# Patient Record
Sex: MALE | Race: WHITE | NOT HISPANIC OR LATINO | URBAN - METROPOLITAN AREA
[De-identification: names, ages, dates, MRNs, and addresses within clinical notes are randomized per-mention and may not be internally consistent; named-entity substitution may affect disease eponyms.]

---

## 2021-01-18 ENCOUNTER — TREATMENT (OUTPATIENT)
Dept: PHYSICAL THERAPY | Facility: CLINIC | Age: 61
End: 2021-01-18

## 2021-01-18 DIAGNOSIS — M25.559 PAIN, HIP: ICD-10-CM

## 2021-01-18 DIAGNOSIS — G89.29 CHRONIC MIDLINE LOW BACK PAIN WITHOUT SCIATICA: Primary | ICD-10-CM

## 2021-01-18 DIAGNOSIS — M54.50 CHRONIC MIDLINE LOW BACK PAIN WITHOUT SCIATICA: Primary | ICD-10-CM

## 2021-01-18 PROCEDURE — 97110 THERAPEUTIC EXERCISES: CPT | Performed by: PHYSICAL THERAPIST

## 2021-01-18 PROCEDURE — 97014 ELECTRIC STIMULATION THERAPY: CPT | Performed by: PHYSICAL THERAPIST

## 2021-01-18 PROCEDURE — 97530 THERAPEUTIC ACTIVITIES: CPT | Performed by: PHYSICAL THERAPIST

## 2021-01-18 PROCEDURE — 97161 PT EVAL LOW COMPLEX 20 MIN: CPT | Performed by: PHYSICAL THERAPIST

## 2021-01-18 NOTE — PROGRESS NOTES
"Physical Therapy Initial Evaluation and Plan of Care    Patient: Delano Cates   : 1960  Diagnosis/ICD-10 Code:  Bilateral low back pain with right-sided sciatica, unspecified chronicity [M54.41]  Referring practitioner: Self Referring    Subjective Evaluation    History of Present Illness  Onset date: chronic.  Mechanism of injury: Chronic LBP with progression to (R) anterolateral hip pain within the past couple of years.  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 Has not seen a doctor for this but reports he has seen a chiropractor who did take xrays and reports an abnormality of his (R) hip joint and some scoliosis of his spine. Has not done any prior PT. Wears a supportive brace for his lumbar spine and (R) hip.    Pain is aggravated by standing, walking, and sleep is interrupted by pain.  Does have difficulty with lifting, carrying, pushing and pulling by the end of the day. Patient denies N/T (R) LE but does report occasional (R) calf pain.  Denies B/B changes. Reports back pain is constant but (R) hip pain is intermittent.      Patient Occupation: [employer] Quality of life: good    Pain  Current pain ratin  At best pain ratin  At worst pain ratin  Location: central lumbosacral region, (R) anterolateral hip  Quality: tight, dull ache and sharp  Relieving factors: support (Icy Hot, Aleve)  Aggravating factors: lifting, movement, standing, ambulation and sleeping  Progression: no change    Diagnostic Tests  X-ray: normal (scoliosis; most recent xray ~ 9 months ago)    Treatments  Previous treatment: chiropractic  Patient Goals  Patient goals for therapy: decreased pain  Patient goal: \"want my back to feel better so I can do some normal things\"           Subjective Questionnaire: Oswestry:  = 38%    Objective          Tenderness     Additional Tenderness Details  Unable to assess; patient is wearing a lumbar support that also supports (R) thigh " which he reports he is unable to remove without undressing    Neurological Testing     Sensation     Lumbar   Left   Intact: light touch    Right   Intact: light touch    Active Range of Motion     Additional Active Range of Motion Details  Lumbar AROM (%):  Flexion 75% limited by hamstrings tightness  Extension 25% with increased LBP  Right sidebend 25% with increased (R) hip pain  Left sidebend 50% with tightness in lumbar region  Right rotation 50% with increased (R) hip pain  Left rotation 50% with tightness    Passive Range of Motion   Left Hip   Normal passive range of motion    Additional Passive Range of Motion Details  Moderately limited (R) hip PROM IR  Mildly limited (R) hip PROM flexion and ER      Strength/Myotome Testing     Lumbar   Left   Heel walk: normal  Toe walk: normal    Right   Heel walk: normal  Toe walk: normal    Left Hip   Planes of Motion   Flexion: 4+  External rotation: 4  Internal rotation: 4    Right Hip   Planes of Motion   Flexion: 4  External rotation: 4-  Internal rotation: 4-    Left Knee   Flexion: 4+  Extension: 5    Right Knee   Flexion: 4  Extension: 4+    Left Ankle/Foot   Dorsiflexion: 4+    Right Ankle/Foot   Dorsiflexion: 4+    Tests     Left Hip   Negative TRACY.     Right Hip   Positive TRACY.     Ambulation     Observational Gait   Gait: asymmetric   Decreased walking speed, right stance time and left step length.   Left arm swing: decreased  Right arm swing: decreased    Additional Observational Gait Details  Patient ambulates with virtually no arm swing and decreased (R) LE weight shift and stance time          Assessment & Plan     Assessment  Impairments: abnormal gait, abnormal or restricted ROM, activity intolerance, impaired physical strength, lacks appropriate home exercise program and pain with function  Assessment details: Patient is a 60 y.o male with a long history of LBP and a more recent onset of (R) hip pain.  He has not seen a physician but rather a  chiropractor who told him xrays showed scoliosis and an abnormality of his (R) hip joint.  He reports symptoms 4-9/10, worst with standing, walking, physical activities required by work, and sleeping.  He relies on an external support for his lumbar and (R) hip regions.  He demonstrates gait abnormality, decreased lumbar AROM, decreased (R) hip ROM, and (R) LE weakness compared to (L). His Oswestry score is 38% indicating a moderate perceived level of physical disability.  He will benefit from skilled PT services to address these deficits and assist patient in reduced pain with functional mobility and work tasks.   Prognosis: good  Functional Limitations: carrying objects, lifting, sleeping, walking, pulling, pushing, uncomfortable because of pain and standing  Goals  Plan Goals: STGs: to be met in 4 weeks  1. Patient will be independent with initial HEP  2. Patient will report improved lumbar and (R) hip symptoms 2-6/10 for improved tolerance to ADLs  3. Patient will report 25% improved sleep for improved restorative healing  4. Patient will demonstrate improved lumbar AROM >/= 75% all planes for improved mobility  5. Patient will demonstrate improved (R) hip PROM all planes to minimal restriction for decreased abnormal joint loading    LTGs: to be met in 8 weeks  1. Patient will be independent with progressed HEP  2. Patient will report improved lumbar and (R) hip symptoms 0-4/10 for improved tolerance to ADLs and functional mobility  3. Patient will demonstrate improved (R) LE strength by 1/2 MMT grade for improved function  4. Patient will ambulate short community distances with 50% improved gait pattern  5. Patient will have improved Oswestry score by 10 percentage points for subjective evidence of functional improvement    Plan  Therapy options: will be seen for skilled physical therapy services  Planned modality interventions: cryotherapy, thermotherapy (hydrocollator packs) and electrical stimulation/Russian  stimulation  Planned therapy interventions: abdominal trunk stabilization, ADL retraining, body mechanics training, flexibility, functional ROM exercises, home exercise program, joint mobilization, manual therapy, motor coordination training, neuromuscular re-education, postural training, soft tissue mobilization, spinal/joint mobilization, strengthening, stretching and therapeutic activities  Duration in visits: 10  Treatment plan discussed with: patient        Manual Therapy:         mins  [id number];  Therapeutic Exercise:    17     mins  51310;     Neuromuscular Luis Fernando:        mins  [id number];    Therapeutic Activity:     10     mins  28027;     Gait Training:           mins  02342;     Ultrasound:          mins  [id number];    Electrical Stimulation:    15     mins  11280 ( );  Dry Needling          mins self-pay    Timed Treatment:   27   mins   Total Treatment:     56   mins    PT SIGNATURE: Danielle Degroot PT DPT   DATE TREATMENT INITIATED: 1/18/2021    Initial Certification  Certification Period: 4/18/2021  I certify that the therapy services are furnished while this patient is under my care.  The services outlined above are required by this patient, and will be reviewed every 90 days.     PHYSICIAN: Referring, Self      DATE:     Please sign and return via fax to [phone]. Thank you, [facility].

## 2021-03-29 ENCOUNTER — DOCUMENTATION (OUTPATIENT)
Dept: PHYSICAL THERAPY | Facility: CLINIC | Age: 61
End: 2021-03-29